# Patient Record
Sex: MALE | Race: WHITE | NOT HISPANIC OR LATINO | Employment: STUDENT | URBAN - METROPOLITAN AREA
[De-identification: names, ages, dates, MRNs, and addresses within clinical notes are randomized per-mention and may not be internally consistent; named-entity substitution may affect disease eponyms.]

---

## 2023-12-17 NOTE — PROGRESS NOTES
"Chief Complaint   Patient presents with    Right Ankle - Pain     Rolled during basketball, swelling and painful.       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    Last seen in 2019 for L PFS / PT.     Injury Mechanism: R foot pain started first, increased running training for baseball.  Pain started about 4-5 weeks ago.     R ankle pain, playing basketball, shot basket from half court, came down on side of foot rolling his ankle; rolled it 2 weeks ago. Wearing walking boot and doing ok. Pain is mostly medial. Was swollen and bruised. Has been able to WB in boot.     Location of pain:  R ankle, medial malleolus, R foot, 2nd metatarsal   Rest for 2 weeks   Icing some; taking ibuprofen.  Is improving ; worse in AM     Home for break - 3 weeks   Past [facility]K HX:  Specialty Problems    None   2019 L PFS / PT.    ROS  12 point ROS reviewed and is negative except for items listed  Happy with his weight, tests on his heart otherwise negative    Social Hx:  Social: lives at home with mother, father, brother  sports: baseball (pitcher)  school: [employer] senior year; exercise science. PT grad programs     Medications:   No current outpatient medications on file prior to visit.     No current facility-administered medications on file prior to visit.         Allergies:  No Known Allergies     Physical Exam:    Visit Vitals  Temp 36.1 °C (96.9 °F)   Ht 1.727 m (5' 8\")   Wt 91.5 kg (201 lb 11.5 oz)   BMI 30.67 kg/m²   BSA 2.1 m²        Vitals reviewed    General appearance: Well-appearing well-nourished  Psych: Normal mood and affect    Neuro: Normal sensation to light touch throughout the involved extremities  Vascular: No extremity edema or " discoloration.  Skin: negative.  Lymphatic: no regional lymphadenopathy present.  Eyes: no conjunctival injection.      BILATERAL   Lower Leg / Ankle / Foot Exam    Inspection:   Pes planus: None  Pes cavus: None  Deformity: None  Soft tissue swelling: Right medial ankle  Erythema: None  Ecchymosis: None  Calf atrophy: None    Range of motion:  Inversion (20-35) full, pain free  Eversion (5-25) full, pain free  Dorsiflexion (20-30) full, pain free  Plantarflexion (40-50) full, pain free  Adduction foot full, pain free  Abduction foot full, pain free    Palpation:  TTP ATFL No  TTP CFL No  TTP Deltoid ligament positive right  TTP Syndesmosis No  TTP Anterior joint line No  TTP Medial malleolus No  TTP Lateral malleolus No  TTP Tibia positive at distal  TTP Fibula No  TTP Talus positive right medial  TTP Calcaneus No  TTP Base of the fifth metatarsal No  TTP Navicular No  TTP Cuboid No  TTP Cuneiforms No  TTP Metatarsals positive right second  TTP Phalanges No    TTP Lis franc joint No  TTP MTP joints No  TTP IP joints No    TTP Achilles No  TTP Peroneal tendon No  TTP Posterior tibialis No  TTP Anterior tibialis No  TTP Extensor hallucis No  TTP Extensor tendons No  TTP Flexor hallucis longus No  TTP Sinus tarsi No  TTP Plantar fascia No    Strength:  Dorsiflexion no pain, 5/5  Plantarflexion no pain, 5/5  Inversion no pain, 5/5  Eversion  no pain, 5/5  Flexion MTP joints no pain, 5/5  Extension MTP joints no pain, 5/5  Flexion IP joints no pain, 5/5  Extension IP joints no pain, 5/5    Ligament Tests:  Anterior drawer: negative  Talar tilt: negative  Foot external rotation test: Pain on right medially  Tibia-fibula squeeze test: negative    Special Tests  Calcaneal squeeze: negative  Forefoot squeeze: neg  Forced passive dorsiflexion (anterior impingement): neg  Hill test: neg  Tinel's: neg at fibular head     Flexibility:   dorsiflexes to neutral with pain on right    Functional Exam:  Able to weight-bear but  pain over medial aspect ankle    Imaging:  X-rays of the right foot and ankle were performed today.  Foot imaging was negative for fracture or healing stress fracture.  Right ankle x-ray showed irregularity along the medial border of the talus with a punctate avulsion concerning for fracture.    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  Patient reported insidious onset of right foot pain associated with baseball conditioning near the second metatarsal.  He then suffered an acute ankle injury while playing pickup basketball 15 days ago.  He had significant swelling and trouble weightbearing was placed into a short walking boot by the school .  He presents today with ongoing pain abdominally over the medial aspect of the right ankle.  Exam today was notable for marked swelling over the medial ankle with positive TTP over the distal tibia, deltoid ligament, medial aspect of the talus and second metatarsal.  He had negative metatarsal squeeze test but positive medial ankle pain with external rotation.  He had pain with weightbearing and x-rays were performed that showed no fracture in the foot but significant irregularity along the medial border of the talus concerning for fracture.  We recommended he remain in the walking boot and an MRI of the right ankle was ordered.  The family should contact us by phone once the MRIs been completed so we can set up a telehealth visit.        ** Please excuse any errors in grammar or translation related to this dictation. Voice recognition software was utilized to prepare this document. **

## 2023-12-18 ENCOUNTER — ANCILLARY PROCEDURE (OUTPATIENT)
Dept: RADIOLOGY | Facility: CLINIC | Age: 23
End: 2023-12-18
Payer: COMMERCIAL

## 2023-12-18 ENCOUNTER — OFFICE VISIT (OUTPATIENT)
Dept: ORTHOPEDIC SURGERY | Facility: CLINIC | Age: 23
End: 2023-12-18
Payer: COMMERCIAL

## 2023-12-18 VITALS — HEIGHT: 68 IN | BODY MASS INDEX: 30.57 KG/M2 | WEIGHT: 201.72 LBS | TEMPERATURE: 96.9 F

## 2023-12-18 DIAGNOSIS — M79.671 RIGHT FOOT PAIN: ICD-10-CM

## 2023-12-18 DIAGNOSIS — M25.571 ACUTE RIGHT ANKLE PAIN: ICD-10-CM

## 2023-12-18 DIAGNOSIS — S92.124A CLOSED NONDISPLACED FRACTURE OF BODY OF RIGHT TALUS, INITIAL ENCOUNTER: Primary | ICD-10-CM

## 2023-12-18 PROCEDURE — 99214 OFFICE O/P EST MOD 30 MIN: CPT | Performed by: PEDIATRICS

## 2023-12-18 PROCEDURE — 99204 OFFICE O/P NEW MOD 45 MIN: CPT | Performed by: PEDIATRICS

## 2023-12-18 PROCEDURE — 73630 X-RAY EXAM OF FOOT: CPT | Mod: RT,FY

## 2023-12-18 PROCEDURE — 73610 X-RAY EXAM OF ANKLE: CPT | Mod: RT,FY

## 2023-12-18 ASSESSMENT — PAIN SCALES - GENERAL: PAINLEVEL: 4

## 2023-12-18 NOTE — PATIENT INSTRUCTIONS
The patient has been referred for advanced imaging through [facility]. Please call [phone] and set up an appointment to have this study completed. We recommend booking at a NON-HOSPITAL location. You will need to allow time for the pre-authorization process. We recommend you call back 1 day prior to your appointment to confirm that your insurance company approved the study. Do not having imaging completed until it is approved.     We request that you call our main office at [phone] once your imaging study has been completed. You may set up an in person appointment or a telehealth appointment to review the imaging results. Please leave us a good call back number. Make sure your voicemail box is accepting messages and be aware we often make calls from private numbers. If you do not receive a call back within 48 business hours, please feel free to call our office again.

## 2024-01-02 ENCOUNTER — ANCILLARY PROCEDURE (OUTPATIENT)
Dept: RADIOLOGY | Facility: CLINIC | Age: 24
End: 2024-01-02
Payer: COMMERCIAL

## 2024-01-02 DIAGNOSIS — S92.124A CLOSED NONDISPLACED FRACTURE OF BODY OF RIGHT TALUS, INITIAL ENCOUNTER: ICD-10-CM

## 2024-01-02 DIAGNOSIS — M25.571 ACUTE RIGHT ANKLE PAIN: ICD-10-CM

## 2024-01-02 PROCEDURE — 73721 MRI JNT OF LWR EXTRE W/O DYE: CPT | Mod: RT

## 2024-01-02 PROCEDURE — 73721 MRI JNT OF LWR EXTRE W/O DYE: CPT | Mod: RIGHT SIDE | Performed by: RADIOLOGY

## 2024-01-04 ENCOUNTER — TELEMEDICINE (OUTPATIENT)
Dept: ORTHOPEDIC SURGERY | Facility: CLINIC | Age: 24
End: 2024-01-04
Payer: COMMERCIAL

## 2024-01-04 DIAGNOSIS — S92.101D: Primary | ICD-10-CM

## 2024-01-04 DIAGNOSIS — S93.401D MODERATE RIGHT ANKLE SPRAIN, SUBSEQUENT ENCOUNTER: ICD-10-CM

## 2024-01-04 PROCEDURE — 99214 OFFICE O/P EST MOD 30 MIN: CPT | Performed by: PEDIATRICS

## 2024-01-04 NOTE — PROGRESS NOTES
Chief Complaint   Patient presents with    Right Ankle - Pain     Rolled during basketball, swelling and painful.       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  Patient reported insidious onset of right foot pain associated with baseball conditioning near the second metatarsal.  He then suffered an acute ankle injury while playing pickup basketball 15 days ago.  He had significant swelling and trouble weightbearing was placed into a short walking boot by the school .  He presents today with ongoing pain abdominally over the medial aspect of the right ankle.  Exam today was notable for marked swelling over the medial ankle with positive TTP over the distal tibia, deltoid ligament, medial aspect of the talus and second metatarsal.  He had negative metatarsal squeeze test but positive medial ankle pain with external rotation.  He had pain with weightbearing and x-rays were performed that showed no fracture in the foot but significant irregularity along the medial border of the talus concerning for fracture.  We recommended he remain in the walking boot and an MRI of the right ankle was ordered.  The family should contact us by phone once the MRIs been completed so we can set up a telehealth visit.    Telehealth visit was performed on 1/4/2024 with the patient.  Patient with improved pain.  He reports removing the boot and walking at times around the house.  Overall he feels improved but does return to school this weekend and is concerned about his ankle  Past MSK HX:  Specialty Problems    None   2019 L PFS / PT.    ROS  12 point ROS reviewed and is negative except for items listed  Happy with his  weight, tests on his heart otherwise negative    Social Hx:  Social: lives at home with mother, father, brother  sports: baseball (pitcher)  school: [employer] senior year; exercise science. PT grad programs     Medications:   No current outpatient medications on file prior to visit.     No current facility-administered medications on file prior to visit.         Allergies:  No Known Allergies     Physical Exam:  Deferred due to telemed visit     Imaging:  MRI   IMPRESSION:  1. High-grade sprain of the deltoid ligament and moderate grade  sprains of the anterior talofibular, calcaneofibular and spring  ligaments.  2. Marrow edema within the sustentaculum patellae, inferolateral  navicular, anteromedial talar neck, superolateral aspect of the talar  dome and within the medial malleolus without discrete fractures.  Findings are likely secondary to direct contusions.  3. Ankle joint effusion  4. 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  Patient reported insidious onset of right foot pain associated with baseball conditioning near the second metatarsal.  He then suffered an acute ankle injury while playing pickup basketball 15 days ago.  He had significant swelling and trouble weightbearing was placed into a short walking boot by the school .  He presents today with ongoing pain abdominally over the medial aspect of the right ankle.  Exam today was notable for marked swelling over the medial ankle with positive TTP over the distal tibia, deltoid ligament, medial aspect of the talus and second metatarsal.  He had negative metatarsal squeeze test but positive medial ankle pain with external rotation.  He had pain with weightbearing and x-rays were performed that showed no fracture in the foot but significant irregularity along  the medial border of the talus concerning for fracture.  We recommended he remain in the walking boot and an MRI of the right ankle was ordered.  The family should contact us by phone once the MRIs been completed so we can set up a telehealth visit.    Telehealth visit was performed on 1/4/2024 with the patient.  We reviewed images online.  Because he has ATFL, CFL, and deltoid ligament injuries in conjunction with a talar OCD lesion and possible os trigonum fracture as well as talar neck edema we recommended surgical consultation with Dr. Deluna.  He was instructed to remain in his boot and see her for evaluation    ** Please excuse any errors in grammar or translation related to this dictation. Voice recognition software was utilized to prepare this document. **   You can access the FollowMyHealth Patient Portal offered by [facility] by registering at the following website: [email or web address]. By joining [employer]’s FollowMyHealth portal, you will also be able to view your health information using other applications (apps) compatible with our system.

## 2024-01-08 ENCOUNTER — OFFICE VISIT (OUTPATIENT)
Dept: ORTHOPEDIC SURGERY | Facility: HOSPITAL | Age: 24
End: 2024-01-08
Payer: COMMERCIAL

## 2024-01-08 DIAGNOSIS — S93.491A SPRAIN OF ANTERIOR TALOFIBULAR LIGAMENT, RIGHT, INITIAL ENCOUNTER: ICD-10-CM

## 2024-01-08 DIAGNOSIS — S93.411A SPRAIN OF CALCANEOFIBULAR LIGAMENT OF RIGHT ANKLE, INITIAL ENCOUNTER: ICD-10-CM

## 2024-01-08 DIAGNOSIS — M95.8 OSTEOCHONDRAL DEFECT OF TALUS: Primary | ICD-10-CM

## 2024-01-08 PROCEDURE — 99204 OFFICE O/P NEW MOD 45 MIN: CPT | Performed by: ORTHOPAEDIC SURGERY

## 2024-01-08 PROCEDURE — 99214 OFFICE O/P EST MOD 30 MIN: CPT | Performed by: ORTHOPAEDIC SURGERY

## 2024-01-08 NOTE — PROGRESS NOTES
HISTORY OF PRESENT ILLNESS  This is a pleasant 23 y.o. year old male  who presents on 01/08/2024 at the request of Dr. Lim for evaluation of  right ankle  pain that has been present over/since 4 weeks.    How the condition occurred or started: playing basketball  Location of pain (patient points to): anterior and medial ankle  Quality of pain: Moderate  Modifying Factors: worse with WB  Associated Signs and symptoms: swelled up a bit right after injury  Previous Treatment: Boot, ice  He is a collegiate pitcher, right-handed.  States that he has rolled his right ankle several times but this time was the worst and different from others. He is studying to become a physical therapist.  He is a senior this year.      PHYSICAL EXAMINATION  Constitutional Exam: patient's height and weight reviewed, well-kempt  Psychiatric Exam: alert and oriented x 3, appropriate mood and behavior  Eye Exam: FANY, EOMI  Pulmonary Exam: breathing non-labored, no apparent distress  Lymphatic exam: no appreciable lymphadenopathy in the lower extremities  Cardiovascular exam: DP pulses 2+ bilaterally, PT 2+ bilaterally, toes are pink with good capillary refill, no pitting edema  Skin exam: no open lesions, rashes, abrasions or ulcerations  Neurological exam: sensation to light touch intact in both lower extremities in peripheral and dermatomal distributions (except for any abnormalities noted in musculoskeletal exam)    Musculoskeletal exam: right ankle and foot: 2+ anterior drawer and 1+ talar tilt, talar crepitus, neg DF-eversion stress test, ankle effusion, less tender over anterior deltoid, sore over anterolateral joint line of ankle and worse with passive hyperdorsiflexion    DATA/RESULTS REVIEW: I personally reviewed the patient's x-ray images and reports of the  right ankle showing effusion, possible avulsion fracture tiny versus calcification ossicle just inferior to medial malleolus .     I personally reviewed the patient's MRI  images and reports showing likely OCD posterolateral talus due to cartilage surface disruption seen on coronal MRI images and underlying bone edema.  High grade 3 tear of ATFL, moderate grade tear CFL, deltoid and spring ligament partial injury, bone edema in malleoli and talus and other areas as noted by radiologist.    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  Discussed with the patient and his parents the options for treatment.  Due to the concern for cartilage unstable chondral flap or full-thickness OCD, in addition to his ruptured ATFL and significant injury to CFL with clinical instability and previous ankle sprains, we would recommend surgical intervention which would involve ankle arthroscopy with possible microfracture of OCD talus posterior laterally followed by open Broström Henderson repair of ATFL and likely CFL with suture anchor and internal brace technique.  Discussed with him he would have to be nonweightbearing for the first 6 weeks after surgery if microfracture is performed.  He would still start physical therapy around the second or third week after surgery but just have restricted weightbearing to allow for the cartilage lesion to heal appropriately.  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  He has at high risk for recurrent injury which could involve worsening his cartilage injury and possible tendon tearing due to significant ligament instability.  He will think about the options and let us know  how he would like to proceed.  Outpatient surgery done on dry anesthetic with popliteal block.  No high-impact activities until least 3 months after surgery if microfracture is performed.  If just a chondral flap and no microfracture needed then he will be nonweightbearing for 2 to 3 weeks to allow the ligament reconstruction to start healing appropriately and then we will advance him to boot weightbearing as tolerated.  Risks of surgery including pain, bleeding, infection, wound healing problems, soft tissue line problems, bone healing problems, hardware complications, injury nerves or vessels, chronic ankle stiffness or swelling, recurrent instability, DVT and others were discussed with the patient.  We also discussed with the patient need to reach out to disability services at his college to set up security transport to and from class, classroom alterations or other seating so he can elevate his leg, discussed with his professors as he may need  accommodations with respect to projects or exams.  Deltoid ligament should heal well; however we will evaluate at arthroscopy and repair with suture anchor only as needed but unlikely due to mri findings.  The patient's questions were answered in detail.      I discussed with patient the procedure in detail, risks and benefits of procedure, post-op protocol, anesthetic used with possible regional block, timeline of recovery, need for protective weightbearing and/or bracing after procedure, pain management protocol, DVT prophylaxis protocol, home preparation suggestions, pre-op surgery preparation, and other pertinent information.    Note dictated with [employer] software, completed without full type editing to avoid delay.      Dear Referring Physician,  It was my pleasure to see your patient, in the office today.  Please refer to the detailed notes above for my findings and recommendations.  Thank you once again for your consultation request.  If you  "have any further questions or concerns, please feel free to contact me via email or at the phone number and address below.  Sincerely,    Italia Deluna MD   of Orthopedic Surgery, [employer]  Dual Fellowship-trained in Orthopedic Sports Medicine (Knee and Shoulder), and Foot and Ankle Surgery  [facility] [employer]   [employer] Subspecialty Certificate in Orthopedic Sports Medicine  Head Team Physician Case \"[place]\" and [facility] \"[employer]\"  Team [facility] Center[facility] Physician [phone] Par[employer]ympic Games  Team [place] Figure Skating Medical Practitioner, including International Event Coverage  Director, Foot and Ankle Division, Department of Orthopedics    [address], [place] [address]  [email or web address]  Office [phone]    "

## 2024-01-23 ENCOUNTER — PREP FOR PROCEDURE (OUTPATIENT)
Dept: ORTHOPEDIC SURGERY | Facility: CLINIC | Age: 24
End: 2024-01-23
Payer: COMMERCIAL

## 2024-01-23 DIAGNOSIS — S93.491A SPRAIN OF ANTERIOR TALOFIBULAR LIGAMENT OF RIGHT ANKLE, INITIAL ENCOUNTER: ICD-10-CM

## 2024-01-23 DIAGNOSIS — S93.411D SPRAIN OF CALCANEOFIBULAR LIGAMENT OF RIGHT ANKLE, SUBSEQUENT ENCOUNTER: ICD-10-CM

## 2024-01-23 DIAGNOSIS — M95.8 OSTEOCHONDRAL DEFECT OF TALUS: ICD-10-CM

## 2024-01-23 PROBLEM — S93.411A SPRAIN OF CALCANEOFIBULAR LIGAMENT OF RIGHT ANKLE: Status: ACTIVE | Noted: 2024-01-23

## 2024-02-06 ENCOUNTER — PREP FOR PROCEDURE (OUTPATIENT)
Dept: ORTHOPEDIC SURGERY | Facility: HOSPITAL | Age: 24
End: 2024-02-06
Payer: COMMERCIAL

## 2024-02-06 ENCOUNTER — ANESTHESIA EVENT (OUTPATIENT)
Dept: OPERATING ROOM | Facility: HOSPITAL | Age: 24
End: 2024-02-06
Payer: COMMERCIAL

## 2024-02-06 RX ORDER — SODIUM CHLORIDE 9 MG/ML
100 INJECTION, SOLUTION INTRAVENOUS CONTINUOUS
Status: CANCELLED | OUTPATIENT
Start: 2024-02-06

## 2024-02-06 NOTE — ANESTHESIA PREPROCEDURE EVALUATION
Patient: Tim Lu    Procedure Information       Date/Time: 02/07/24 8000    Procedure: Right Ankle Arthroscopy; Subchondral Drilling Osteochrondral Defect of Talus; Brostrum Henderson Repair of Both Collateral Ligaments of Ankle (Right: Ankle)    Location: [facility] / Virtual [facility]    Surgeons: Italia Deluna MD            Relevant Problems   Anesthesia (within normal limits)      Cardiovascular (within normal limits)      Pulmonary (within normal limits)       Clinical information reviewed:                   NPO Detail:  No data recorded     Physical Exam    Airway  Mallampati: I     Cardiovascular   Rhythm: regular  Rate: normal     Dental    Pulmonary   Breath sounds clear to auscultation     Abdominal            Anesthesia Plan    History of general anesthesia?: yes  History of complications of general anesthesia?: no    ASA 1     general and regional     intravenous induction   Anesthetic plan and risks discussed with patient.    Plan discussed with CRNA and CAA.

## 2024-02-07 ENCOUNTER — ANESTHESIA (OUTPATIENT)
Dept: OPERATING ROOM | Facility: HOSPITAL | Age: 24
End: 2024-02-07
Payer: COMMERCIAL

## 2024-02-07 ENCOUNTER — APPOINTMENT (OUTPATIENT)
Dept: RADIOLOGY | Facility: HOSPITAL | Age: 24
End: 2024-02-07
Payer: COMMERCIAL

## 2024-02-07 ENCOUNTER — HOSPITAL ENCOUNTER (OUTPATIENT)
Facility: HOSPITAL | Age: 24
Setting detail: OUTPATIENT SURGERY
Discharge: HOME | End: 2024-02-07
Attending: ORTHOPAEDIC SURGERY | Admitting: ORTHOPAEDIC SURGERY
Payer: COMMERCIAL

## 2024-02-07 VITALS
BODY MASS INDEX: 31.04 KG/M2 | TEMPERATURE: 98.1 F | WEIGHT: 204.81 LBS | DIASTOLIC BLOOD PRESSURE: 91 MMHG | RESPIRATION RATE: 13 BRPM | HEIGHT: 68 IN | SYSTOLIC BLOOD PRESSURE: 145 MMHG | HEART RATE: 79 BPM | OXYGEN SATURATION: 99 %

## 2024-02-07 DIAGNOSIS — M95.8 OSTEOCHONDRAL DEFECT OF TALUS: ICD-10-CM

## 2024-02-07 DIAGNOSIS — S93.491A SPRAIN OF ANTERIOR TALOFIBULAR LIGAMENT OF RIGHT ANKLE, INITIAL ENCOUNTER: ICD-10-CM

## 2024-02-07 DIAGNOSIS — S93.411D SPRAIN OF CALCANEOFIBULAR LIGAMENT OF RIGHT ANKLE, SUBSEQUENT ENCOUNTER: ICD-10-CM

## 2024-02-07 DIAGNOSIS — S93.411A SPRAIN OF CALCANEOFIBULAR LIGAMENT OF RIGHT ANKLE, INITIAL ENCOUNTER: Primary | ICD-10-CM

## 2024-02-07 PROCEDURE — A4217 STERILE WATER/SALINE, 500 ML: HCPCS | Performed by: ORTHOPAEDIC SURGERY

## 2024-02-07 PROCEDURE — 3700000001 HC GENERAL ANESTHESIA TIME - INITIAL BASE CHARGE: Performed by: ORTHOPAEDIC SURGERY

## 2024-02-07 PROCEDURE — 27696 REPAIR OF ANKLE LIGAMENTS: CPT | Performed by: ORTHOPAEDIC SURGERY

## 2024-02-07 PROCEDURE — 3600000004 HC OR TIME - INITIAL BASE CHARGE - PROCEDURE LEVEL FOUR: Performed by: ORTHOPAEDIC SURGERY

## 2024-02-07 PROCEDURE — 2500000004 HC RX 250 GENERAL PHARMACY W/ HCPCS (ALT 636 FOR OP/ED): Performed by: ANESTHESIOLOGY

## 2024-02-07 PROCEDURE — 2500000004 HC RX 250 GENERAL PHARMACY W/ HCPCS (ALT 636 FOR OP/ED): Performed by: ORTHOPAEDIC SURGERY

## 2024-02-07 PROCEDURE — 3700000002 HC GENERAL ANESTHESIA TIME - EACH INCREMENTAL 1 MINUTE: Performed by: ORTHOPAEDIC SURGERY

## 2024-02-07 PROCEDURE — 2500000005 HC RX 250 GENERAL PHARMACY W/O HCPCS: Performed by: ANESTHESIOLOGIST ASSISTANT

## 2024-02-07 PROCEDURE — C1713 ANCHOR/SCREW BN/BN,TIS/BN: HCPCS | Performed by: ORTHOPAEDIC SURGERY

## 2024-02-07 PROCEDURE — 7100000002 HC RECOVERY ROOM TIME - EACH INCREMENTAL 1 MINUTE: Performed by: ORTHOPAEDIC SURGERY

## 2024-02-07 PROCEDURE — A27696 PR REPAIR BOTH COLLAT ANKL LIGMT,PRIMRY: Performed by: ANESTHESIOLOGIST ASSISTANT

## 2024-02-07 PROCEDURE — 7100000001 HC RECOVERY ROOM TIME - INITIAL BASE CHARGE: Performed by: ORTHOPAEDIC SURGERY

## 2024-02-07 PROCEDURE — 64445 NJX AA&/STRD SCIATIC NRV IMG: CPT | Performed by: ANESTHESIOLOGY

## 2024-02-07 PROCEDURE — 29891 ARTHR ANK OSTCHN DF TAL&/TIB: CPT | Performed by: ORTHOPAEDIC SURGERY

## 2024-02-07 PROCEDURE — 76000 FLUOROSCOPY <1 HR PHYS/QHP: CPT | Mod: RT

## 2024-02-07 PROCEDURE — 2500000004 HC RX 250 GENERAL PHARMACY W/ HCPCS (ALT 636 FOR OP/ED): Performed by: ANESTHESIOLOGIST ASSISTANT

## 2024-02-07 PROCEDURE — 2780000003 HC OR 278 NO HCPCS: Performed by: ORTHOPAEDIC SURGERY

## 2024-02-07 PROCEDURE — 3600000009 HC OR TIME - EACH INCREMENTAL 1 MINUTE - PROCEDURE LEVEL FOUR: Performed by: ORTHOPAEDIC SURGERY

## 2024-02-07 PROCEDURE — 7100000010 HC PHASE TWO TIME - EACH INCREMENTAL 1 MINUTE: Performed by: ORTHOPAEDIC SURGERY

## 2024-02-07 PROCEDURE — A27696 PR REPAIR BOTH COLLAT ANKL LIGMT,PRIMRY: Performed by: ANESTHESIOLOGY

## 2024-02-07 PROCEDURE — 2720000007 HC OR 272 NO HCPCS: Performed by: ORTHOPAEDIC SURGERY

## 2024-02-07 PROCEDURE — 7100000009 HC PHASE TWO TIME - INITIAL BASE CHARGE: Performed by: ORTHOPAEDIC SURGERY

## 2024-02-07 DEVICE — IB KIT, BC, W/ CC FT AND JUMPSTART
Type: IMPLANTABLE DEVICE | Site: ANKLE | Status: FUNCTIONAL
Brand: ARTHREX®

## 2024-02-07 DEVICE — IMPLANTABLE DEVICE: Type: IMPLANTABLE DEVICE | Site: ANKLE | Status: FUNCTIONAL

## 2024-02-07 RX ORDER — OXYCODONE AND ACETAMINOPHEN 5; 325 MG/1; MG/1
1-2 TABLET ORAL EVERY 6 HOURS PRN
Qty: 40 TABLET | Refills: 0 | Status: SHIPPED | OUTPATIENT
Start: 2024-02-07 | End: 2024-02-14

## 2024-02-07 RX ORDER — LIDOCAINE HYDROCHLORIDE 10 MG/ML
0.1 INJECTION, SOLUTION EPIDURAL; INFILTRATION; INTRACAUDAL; PERINEURAL ONCE
Status: DISCONTINUED | OUTPATIENT
Start: 2024-02-07 | End: 2024-02-07 | Stop reason: HOSPADM

## 2024-02-07 RX ORDER — DEXAMETHASONE SODIUM PHOSPHATE 4 MG/ML
INJECTION, SOLUTION INTRA-ARTICULAR; INTRALESIONAL; INTRAMUSCULAR; INTRAVENOUS; SOFT TISSUE AS NEEDED
Status: DISCONTINUED | OUTPATIENT
Start: 2024-02-07 | End: 2024-02-07

## 2024-02-07 RX ORDER — ONDANSETRON HYDROCHLORIDE 2 MG/ML
INJECTION, SOLUTION INTRAVENOUS AS NEEDED
Status: DISCONTINUED | OUTPATIENT
Start: 2024-02-07 | End: 2024-02-07

## 2024-02-07 RX ORDER — SODIUM CHLORIDE, SODIUM LACTATE, POTASSIUM CHLORIDE, CALCIUM CHLORIDE 600; 310; 30; 20 MG/100ML; MG/100ML; MG/100ML; MG/100ML
INJECTION, SOLUTION INTRAVENOUS CONTINUOUS PRN
Status: DISCONTINUED | OUTPATIENT
Start: 2024-02-07 | End: 2024-02-07

## 2024-02-07 RX ORDER — SODIUM CHLORIDE, SODIUM LACTATE, POTASSIUM CHLORIDE, AND CALCIUM CHLORIDE .6; .31; .03; .02 G/100ML; G/100ML; G/100ML; G/100ML
IRRIGANT IRRIGATION AS NEEDED
Status: DISCONTINUED | OUTPATIENT
Start: 2024-02-07 | End: 2024-02-07 | Stop reason: HOSPADM

## 2024-02-07 RX ORDER — OXYCODONE HYDROCHLORIDE 5 MG/1
5 TABLET ORAL EVERY 4 HOURS PRN
Status: DISCONTINUED | OUTPATIENT
Start: 2024-02-07 | End: 2024-02-07 | Stop reason: HOSPADM

## 2024-02-07 RX ORDER — ONDANSETRON HYDROCHLORIDE 2 MG/ML
4 INJECTION, SOLUTION INTRAVENOUS ONCE AS NEEDED
Status: COMPLETED | OUTPATIENT
Start: 2024-02-07 | End: 2024-02-07

## 2024-02-07 RX ORDER — ROCURONIUM BROMIDE 10 MG/ML
INJECTION, SOLUTION INTRAVENOUS AS NEEDED
Status: DISCONTINUED | OUTPATIENT
Start: 2024-02-07 | End: 2024-02-07

## 2024-02-07 RX ORDER — MIDAZOLAM HYDROCHLORIDE 1 MG/ML
INJECTION, SOLUTION INTRAMUSCULAR; INTRAVENOUS AS NEEDED
Status: DISCONTINUED | OUTPATIENT
Start: 2024-02-07 | End: 2024-02-07

## 2024-02-07 RX ORDER — ACETAMINOPHEN 325 MG/1
650 TABLET ORAL EVERY 4 HOURS PRN
Status: DISCONTINUED | OUTPATIENT
Start: 2024-02-07 | End: 2024-02-07 | Stop reason: HOSPADM

## 2024-02-07 RX ORDER — ASPIRIN 325 MG
325 TABLET, DELAYED RELEASE (ENTERIC COATED) ORAL DAILY
Qty: 21 TABLET | Refills: 0 | Status: SHIPPED | OUTPATIENT
Start: 2024-02-08

## 2024-02-07 RX ORDER — DOCUSATE SODIUM 100 MG/1
100 CAPSULE, LIQUID FILLED ORAL 2 TIMES DAILY
Qty: 20 CAPSULE | Refills: 0 | Status: SHIPPED | OUTPATIENT
Start: 2024-02-07 | End: 2024-03-01 | Stop reason: ALTCHOICE

## 2024-02-07 RX ORDER — ONDANSETRON 4 MG/1
4 TABLET, FILM COATED ORAL EVERY 8 HOURS PRN
Qty: 20 TABLET | Refills: 0 | Status: SHIPPED | OUTPATIENT
Start: 2024-02-07 | End: 2024-03-01 | Stop reason: ALTCHOICE

## 2024-02-07 RX ORDER — CEFAZOLIN 1 G/1
INJECTION, POWDER, FOR SOLUTION INTRAVENOUS AS NEEDED
Status: DISCONTINUED | OUTPATIENT
Start: 2024-02-07 | End: 2024-02-07

## 2024-02-07 RX ORDER — SODIUM CHLORIDE, SODIUM LACTATE, POTASSIUM CHLORIDE, CALCIUM CHLORIDE 600; 310; 30; 20 MG/100ML; MG/100ML; MG/100ML; MG/100ML
100 INJECTION, SOLUTION INTRAVENOUS CONTINUOUS
Status: DISCONTINUED | OUTPATIENT
Start: 2024-02-07 | End: 2024-02-07 | Stop reason: HOSPADM

## 2024-02-07 RX ORDER — CEFAZOLIN SODIUM 2 G/100ML
2 INJECTION, SOLUTION INTRAVENOUS ONCE
Status: DISCONTINUED | OUTPATIENT
Start: 2024-02-07 | End: 2024-02-07 | Stop reason: HOSPADM

## 2024-02-07 RX ORDER — PROPOFOL 10 MG/ML
INJECTION, EMULSION INTRAVENOUS AS NEEDED
Status: DISCONTINUED | OUTPATIENT
Start: 2024-02-07 | End: 2024-02-07

## 2024-02-07 RX ORDER — ALBUTEROL SULFATE 0.83 MG/ML
2.5 SOLUTION RESPIRATORY (INHALATION) ONCE AS NEEDED
Status: DISCONTINUED | OUTPATIENT
Start: 2024-02-07 | End: 2024-02-07 | Stop reason: HOSPADM

## 2024-02-07 RX ORDER — SODIUM CHLORIDE 0.9 G/100ML
IRRIGANT IRRIGATION AS NEEDED
Status: DISCONTINUED | OUTPATIENT
Start: 2024-02-07 | End: 2024-02-07 | Stop reason: HOSPADM

## 2024-02-07 RX ORDER — FENTANYL CITRATE 50 UG/ML
INJECTION, SOLUTION INTRAMUSCULAR; INTRAVENOUS AS NEEDED
Status: DISCONTINUED | OUTPATIENT
Start: 2024-02-07 | End: 2024-02-07

## 2024-02-07 RX ORDER — SODIUM CHLORIDE 9 MG/ML
100 INJECTION, SOLUTION INTRAVENOUS CONTINUOUS
Status: DISCONTINUED | OUTPATIENT
Start: 2024-02-07 | End: 2024-02-07 | Stop reason: HOSPADM

## 2024-02-07 RX ORDER — IPRATROPIUM BROMIDE 0.5 MG/2.5ML
500 SOLUTION RESPIRATORY (INHALATION) ONCE
Status: DISCONTINUED | OUTPATIENT
Start: 2024-02-07 | End: 2024-02-07 | Stop reason: HOSPADM

## 2024-02-07 RX ORDER — LIDOCAINE HYDROCHLORIDE 20 MG/ML
INJECTION, SOLUTION INFILTRATION; PERINEURAL AS NEEDED
Status: DISCONTINUED | OUTPATIENT
Start: 2024-02-07 | End: 2024-02-07

## 2024-02-07 RX ADMIN — FENTANYL CITRATE 50 MCG: 50 INJECTION, SOLUTION INTRAMUSCULAR; INTRAVENOUS at 11:36

## 2024-02-07 RX ADMIN — SODIUM CHLORIDE, POTASSIUM CHLORIDE, SODIUM LACTATE AND CALCIUM CHLORIDE: 600; 310; 30; 20 INJECTION, SOLUTION INTRAVENOUS at 11:24

## 2024-02-07 RX ADMIN — SUGAMMADEX 200 MG: 100 INJECTION, SOLUTION INTRAVENOUS at 14:27

## 2024-02-07 RX ADMIN — ONDANSETRON 4 MG: 2 INJECTION INTRAMUSCULAR; INTRAVENOUS at 15:06

## 2024-02-07 RX ADMIN — SODIUM CHLORIDE, POTASSIUM CHLORIDE, SODIUM LACTATE AND CALCIUM CHLORIDE 100 ML/HR: 600; 310; 30; 20 INJECTION, SOLUTION INTRAVENOUS at 15:06

## 2024-02-07 RX ADMIN — FENTANYL CITRATE 100 MCG: 50 INJECTION, SOLUTION INTRAMUSCULAR; INTRAVENOUS at 11:20

## 2024-02-07 RX ADMIN — DEXAMETHASONE SODIUM PHOSPHATE 4 MG: 4 INJECTION, SOLUTION INTRAMUSCULAR; INTRAVENOUS at 11:42

## 2024-02-07 RX ADMIN — ROCURONIUM BROMIDE 20 MG: 10 INJECTION, SOLUTION INTRAVENOUS at 12:23

## 2024-02-07 RX ADMIN — MIDAZOLAM HYDROCHLORIDE 5 MG: 1 INJECTION, SOLUTION INTRAMUSCULAR; INTRAVENOUS at 11:20

## 2024-02-07 RX ADMIN — ONDANSETRON 4 MG: 2 INJECTION INTRAMUSCULAR; INTRAVENOUS at 14:13

## 2024-02-07 RX ADMIN — ROCURONIUM BROMIDE 30 MG: 10 INJECTION, SOLUTION INTRAVENOUS at 13:06

## 2024-02-07 RX ADMIN — ACETAMINOPHEN 650 MG: 325 TABLET ORAL at 15:27

## 2024-02-07 RX ADMIN — CEFAZOLIN 2 G: 1 INJECTION, POWDER, FOR SOLUTION INTRAMUSCULAR; INTRAVENOUS at 11:40

## 2024-02-07 RX ADMIN — LIDOCAINE HYDROCHLORIDE 100 MG: 20 INJECTION, SOLUTION INFILTRATION; PERINEURAL at 11:36

## 2024-02-07 RX ADMIN — PROPOFOL 200 MG: 10 INJECTION, EMULSION INTRAVENOUS at 11:36

## 2024-02-07 RX ADMIN — ROCURONIUM BROMIDE 50 MG: 10 INJECTION, SOLUTION INTRAVENOUS at 11:36

## 2024-02-07 ASSESSMENT — PAIN - FUNCTIONAL ASSESSMENT
PAIN_FUNCTIONAL_ASSESSMENT: 0-10

## 2024-02-07 ASSESSMENT — COLUMBIA-SUICIDE SEVERITY RATING SCALE - C-SSRS
6. HAVE YOU EVER DONE ANYTHING, STARTED TO DO ANYTHING, OR PREPARED TO DO ANYTHING TO END YOUR LIFE?: NO
2. HAVE YOU ACTUALLY HAD ANY THOUGHTS OF KILLING YOURSELF?: NO
1. IN THE PAST MONTH, HAVE YOU WISHED YOU WERE DEAD OR WISHED YOU COULD GO TO SLEEP AND NOT WAKE UP?: NO

## 2024-02-07 ASSESSMENT — PAIN SCALES - GENERAL
PAINLEVEL_OUTOF10: 0 - NO PAIN
PAINLEVEL_OUTOF10: 3
PAIN_LEVEL: 0
PAINLEVEL_OUTOF10: 1
PAINLEVEL_OUTOF10: 0 - NO PAIN
PAINLEVEL_OUTOF10: 0 - NO PAIN

## 2024-02-07 ASSESSMENT — ENCOUNTER SYMPTOMS
MYALGIAS: 1
JOINT SWELLING: 1

## 2024-02-07 NOTE — NURSING NOTE
1600- Dr. Garcia came to see patient for right sided facial numbness, stated that it could be from the side the tube was on and that it should improve with time. Discharge teaching done with patient and parents. Patient ambulated to bathroom with crutches. Patient transport requested  1624- IV removed, patient and family have no questions at this time

## 2024-02-07 NOTE — H&P
History Of Present Illness  Tim Lu is a 23 y.o. male presenting with right ankle sprain. Patient here for right ankle arthroscopy with extensive debridement and possible subchondral drilling of talar OCD, open lateral ligament repair Brostrum-Henderson procedure. Patient reports he is doing well. No complaints.      Past Medical History  Past Medical History:   Diagnosis Date    Achilles tendinitis, unspecified leg 03/20/2014    Achilles tendinitis    Acute pharyngitis, unspecified 03/20/2014    Sore throat    Acute sinusitis, unspecified 09/25/2018    Acute sinusitis with symptoms > 10 days    Bitten or stung by nonvenomous insect and other nonvenomous arthropods, initial encounter 12/06/2017    Tick bite    Effusion, unspecified knee 03/14/2019    Swelling of knee    Other injuries of left eye and orbit, initial encounter 03/16/2017    Abrasion of sclera of left eye    Other specified enthesopathies of unspecified lower limb, excluding foot 04/22/2019    Quadriceps tendinitis    Pain in left knee 01/17/2020    Left knee pain    Patellar tendinitis, left knee 12/09/2019    Patellar tendinitis of left knee    Patellofemoral disorders, left knee 12/09/2019    Patellofemoral pain syndrome of left knee    Personal history of diseases of the skin and subcutaneous tissue 03/09/2022    History of eczema    Personal history of diseases of the skin and subcutaneous tissue 11/07/2017    History of acne    Personal history of other diseases of the nervous system and sense organs 05/26/2015    History of acute conjunctivitis    Personal history of other diseases of the respiratory system 02/28/2018    History of acute sinusitis    Personal history of other diseases of the respiratory system 02/04/2017    History of acute sinusitis    Personal history of other diseases of the respiratory system 04/29/2014    History of streptococcal pharyngitis    Personal history of other diseases of the respiratory system 11/24/2021     "History of acute sinusitis    Personal history of other infectious and parasitic diseases 01/21/2020    History of tinea corporis    Personal history of other specified conditions 10/09/2018    History of persistent cough    Personal history of other specified conditions 12/15/2021    History of nasal congestion    Personal history of other specified conditions 12/15/2021    History of persistent cough    Unspecified injury of left eye and orbit, initial encounter     Left eye injury, initial encounter       Surgical History  History reviewed. No pertinent surgical history.     Social History  He reports that he has never smoked. He has never used smokeless tobacco. He reports current alcohol use. He reports current drug use. Drug: Marijuana.    Family History  No family history on file.     Allergies  Patient has no known allergies.    Review of Systems   Musculoskeletal:  Positive for joint swelling and myalgias.   All other systems reviewed and are negative.       Physical Exam  Constitutional:       Appearance: Normal appearance. He is normal weight.   HENT:      Head: Normocephalic.      Nose: Nose normal.   Pulmonary:      Effort: Pulmonary effort is normal.   Musculoskeletal:         General: Normal range of motion.      Cervical back: Normal range of motion.   Neurological:      Mental Status: He is alert and oriented to person, place, and time.   Psychiatric:         Mood and Affect: Mood normal.         Behavior: Behavior normal.         Thought Content: Thought content normal.         Judgment: Judgment normal.          Last Recorded Vitals  Blood pressure 144/74, pulse 74, temperature 36.5 °C (97.7 °F), temperature source Temporal, resp. rate 16, height 1.727 m (5' 8\"), weight 92.9 kg (204 lb 12.9 oz), SpO2 100 %.    Relevant Results        right ankle arthroscopy with extensive debridement and possible subchondral drilling of talar OCD, open lateral ligament repair Brostrum-Henderson procedure.   "   Assessment/Plan   Active Problems:    Osteochondral defect of talus    Sprain of anterior talofibular ligament of right ankle    Sprain of calcaneofibular ligament of right ankle             I spent 6 minutes in the professional and overall care of this patient.      Sean Pretty PA-C

## 2024-02-07 NOTE — BRIEF OP NOTE
Date: 2024  OR Location: 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.8]     * Sprain of anterior talofibular ligament of right ankle, initial encounter [S93.491A]     * Sprain of calcaneofibular ligament of right ankle, subsequent encounter [S93.411D]     Procedures  Right Ankle Arthroscopy; Subchondral Drilling Osteochrondral Defect of Talus; Brostrum Hednerson Repair of Both Collateral Ligaments of Ankle  OH RPR PRIMARY DISRUPTED LIGAMENT ANKLE both COLLATERAL LIGAMENTS [29284]  OH ARTHRS ANKLE EXC OSTCHNDRL DFCT W/DRLG DFCT [83190]    Surgeons      * Italia Deluna - Primary    Resident/Fellow/Other Assistant:  Surgeon(s) and Role:     * Kory Metcalf DPM - Resident - Assisting  Jocy STEELE 2  Procedure Summary  Anesthesia: General  ASA: I  Anesthesia Staff: Anesthesiologist: Bruno Howell MD; Alexander Terry MD  C-AA: JOSE Schmidt; JOSE Sanchez  Estimated Blood Loss: 0mL  Intra-op Medications:   Administrations occurring from 1130 to 1530 on 24:   Medication Name Total Dose   sodium chloride 0.9 % irrigation solution 1,000 mL   lactated Ringer's irrigation solution 6,000 mL              Anesthesia Record               Intraprocedure I/O Totals          Intake    LR infusion 850.00 mL    Total Intake 850 mL       Output    Est. Blood Loss 3 mL    Total Output 3 mL       Net    Net Volume 847 mL          Specimen: No specimens collected     Staff:   Circulator: Kiel Malik RN  Relief Circulator: Char Johnson RN  Scrub Person: Valentin Maguire; Caren Weaver; Jaymie Fritz          Findings: consistent with clinical  and imaging findings     Complications:  None; patient tolerated the procedure well.     Disposition: PACU - hemodynamically stable.  Condition: stable  Specimens Collected: No specimens collected  Attending Attestation: I was personally scrubbed through the entire case.    Italia Deluna  Phone Number: [phone]

## 2024-02-07 NOTE — ANESTHESIA PROCEDURE NOTES
Airway  Date/Time: 2/7/2024 11:39 AM  Urgency: elective      Staffing  Performed: JOSE   Authorized by: Bruno Howell MD    Performed by: JOSE Schmidt  Patient location during procedure: OR    Indications and Patient Condition  Indications for airway management: anesthesia  Spontaneous Ventilation: absent  Sedation level: deep  Preoxygenated: yes  Mask difficulty assessment: 2 - vent by mask + OA or adjuvant +/- NMBA    Final Airway Details  Final airway type: endotracheal airway      Successful airway: ETT  Cuffed: yes   Successful intubation technique: direct laryngoscopy  Blade: Charles  Blade size: #4  ETT size (mm): 7.0  Cormack-Lehane Classification: grade I - full view of glottis  Placement verified by: chest auscultation   Measured from: teeth  ETT to teeth (cm): 22  Number of attempts at approach: 1

## 2024-02-07 NOTE — OP NOTE
Right Ankle Arthroscopy; Subchondral Drilling Osteochrondral Defect of Talus; Brostrum Henderson Repair of Both Collateral Ligaments of Ankle (R) Operative Note     Date: 2024  OR Location: [facility] OR    Name: Tim Lu, : 2000, Age: 23 y.o., MRN: [id number], Sex: male    Diagnosis  Pre-op Diagnosis     * Osteochondral defect of talus [M95.8]     * Sprain of anterior talofibular ligament of right ankle, initial encounter [S93.491A]     * Sprain of calcaneofibular ligament of right ankle, subsequent encounter [S93.411D] Post-op Diagnosis     * Osteochondral defect of talus [M95.8]     * Sprain of anterior talofibular ligament of right ankle, initial encounter [S93.491A]     * Sprain of calcaneofibular ligament of right ankle, subsequent encounter [S93.411D]     Procedures  Right Ankle Arthroscopy; Subchondral Drilling Osteochrondral Defect of Talus; Brostrum Henderson Repair of Both Collateral Ligaments of Ankle  ID RPR PRIMARY DISRUPTED LIGAMENT ANKLE BOTH LATERAL COLLATERAL [98182]  ID ARTHRS ANKLE EXC OSTCHNDRL DFCT W/DRLG DFCT [[id number]]    Surgeons      * Italia Deluna - Primary    Resident/Fellow/Other Assistant:  Surgeon(s) and Role:     * Kory Metcalf DPM - Resident - Assisting    Procedure Summary  Anesthesia: General  ASA: I  Anesthesia Staff: Anesthesiologist: Bruno Howell MD; Alexander Terry MD  C-AA: JOSE Schmidt; JOSE Sanchez  Estimated Blood Loss: less than 5mL  Intra-op Medications:   Administrations occurring from 1130 to 1530 on 24:   Medication Name Total Dose   sodium chloride 0.9 % irrigation solution 1,000 mL   lactated Ringer's irrigation solution 6,000 mL   lactated Ringer's infusion 40 mL   oxygen (O2) therapy Cannot be calculated   ondansetron (Zofran) injection 4 mg 4 mg              Anesthesia Record               Intraprocedure I/O Totals          Intake    LR infusion 850.00 mL    Total Intake 850 mL       Output    Est. Blood Loss 3 mL    Total  Output 3 mL       Net    Net Volume 847 mL          Specimen: No specimens collected     Staff:   Circulator: Kiel Malik RN  Relief Circulator: Char Johnson RN  Scrub Person: Valentin Ting; Caren Weaver; Jaymie Fritz         Drains and/or Catheters: * None in log *    Tourniquet Times:     Total Tourniquet Time Documented:  Thigh (Right) - 137 minutes  Total: Thigh (Right) - 137 minutes      Implants:  Implants       Type Name Action Serial No.      Implant BRACE KIT, INTERNAL ANKLE, BC, W/CC FT AND JUMP START - SN/A - [id number] Implanted N/A     Suture DX Knotless FiberTak Instability Implant Kit Implanted N/A            Indications: Tim Lu is an 23 y.o. 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 After discussing the alternatives of treatment in detail with the patient, the patient selected surgical intervention and/or reconstruction.  We discussed with the patient risks and benefits of the procedure(s).  Risks of surgery were reviewed including pain, bleeding, infection, wound healing problems, soft tissue or bone healing problems, injury to nerves or vessels, implant or hardware related complications, chronic extremity stiffness or pain or swelling, post-traumatic arthritis, recurrent symptoms, DVT, PE and other medical complications.  After the patient´s questions were answered in detail including post-operative course requiring nonweightbearing initially and the extensive rehabilitation needed after surgery, the patient then gave informed consent for the procedure.    DESCRIPTION OF PROCEDURE  The patient was identified in the pre-operative area and the surgical extremity was marked with a skin marker to designate surgical site.  Anesthesia consult placed popliteal block without complication.   Patient then was brought back to the operating room.  A huddle was called and confirmed upon entry into the operating room as per protocol.  Time out was called and confirmed prior to skin incision.  General anesthetic was administered and LMA placed without complication.  Patient received IV Ancef prior to skin incision as per protocol.  DVT prophylaxis was performed using stocking and SCD application on well leg.  A well-padded tourniquet was placed on the right upper thigh and was elevated to 280mmHg for 137 minutes during the case.  The patient then was carefully positioned on the beanbag with bump underneath the ipsilateral hip to help with ankle access during the case.  All superficial nerve areas and bony prominences were well-padded and protected during the case and neutral spinal alignment maintained.  The operative leg was placed in a well-padded Ferkel leg noriega to protect the neurovascular structures.  We then proceeded to prep and drape in the usual standard sterile fashion.  The sterile ankle stirrup and distractor system was gently applied to the operative ankle.  We then proceeded with right ankle arthroscopy with subchondral drilling of osteochondral defect of the talus.  Anatomical landmarks of the right ankle were then identified and anteromedial portal localized with an 18 gauge spinal needle.  A small nick in the skin was made with an 11 blade and portal was then made with a mosquito hemostat.  We then introduced the 2.7mm 30 degree arthroscope into the ankle joint.  Looking anterolaterally, we then localized our anterolateral portal under direct visualization with an 18 gauge spinal needle.  Yandel and spread technique was then used to create the anterolateral portal.  We then performed diagnostic arthroscopy which revealed osteochondral defect of talus with delaminated loose cartilage involving the anterior lateral talus (3 unstable flaps), impaction injury to the tibial plafond and lateral  aspect, small area chondromalacia grade 2 posterior medial tibia, symptomatic meniscoid lesion, two plical bands 1 anterior medially and 1 anterior laterally extending across the joint anteriorly causing impingement and some areas of grade 2 chondromalacia of the anterior portion of the distal aspect of the talar dome.  We then proceeded with extensive debridement of the medial lateral gutters, excision of the hypertrophic plical bands or adhesions, and utilized multiple curettes curved and ring type to remove the unstable cartilage flap from the osteochondral defect of the anterolateral talus.  We evaluated the anterior portion of the distal tibia and there was no spur formation.  We evaluated the talar neck from the medial and lateral sides and there were no significant spurs on the anterior talar neck areas.  Once we had a nice healthy cartilage border to the osteochondral defect, the defect size was measured with probe and was 6 mm anterior to posterior and 6 mm medial to lateral.  We then proceeded with microfracture or subchondral drilling of the osteochondral defect of the anterior lateral talar dome utilizing curved awl brought through the anterior lateral portal with the scope in the anterior medial portal.  Once we completed microfracture or subchondral drilling, we then switched portals with the scope and then turned off the fluid and applied suction to make sure there was good bleeding at the osteochondral defect site which there was.  We then copiously irrigated the ankle joint.  Once the arthroscopy was completed, the operative ankle was carefully taken out of the ankle stirrup and leg noriega removed.  Portals then were closed with 4-0 nylon sutures.  We then proceeded with the open portion of the case, repair of both lateral collateral ligaments of the right ankle ATFL and CFL with Broström Henderson technique augmented with internal brace.  We then utilized a curvilinear incision starting from the distal  tip of the fibula extending toward the talar neck.  Incision was made with 15 blade.  Bipolar cautery was utilized throughout the case for careful hemostasis.  We then carefully dissected and then identified the inferior extensor retinaculum.  We then placed tenotomy scissor underneath the retinaculum and placed tag stitches with 2-0 Vicryl for later repair and advancement of the retinaculum.  We then released the retinaculum with tenotomy scissors.  We then identified the ATFL which was hypertrophic and lax.  Examination anesthesia revealed 2+ anterior drawer test and 2+ talar tilt test.  We placed a hemostat underneath the ATFL we confirmed the footprint of the ATFL utilizing bony landmark of the posterior lateral tip of the fibula and centered footprint of the ATFL was approximately 14 mm away.  We then utilized the fibertak 1.3 mm suture anchor Arthrex and placed 2 anchors 1 on the superior aspect and 1 on the inferior aspect of the ATFL.  We drilled into the fibula, placed the anchor and then did Tim-Ronnie type advancement stitches utilizing FiberWire suture from each anchor and different portions of the ligament.  We then everted the hindfoot and ankle dorsiflexed as we tensioned the FiberWire stitch and tightened the ATFL.  Knotless anchor was utilized we then trimmed the suture once the anchor and stitch were seated for both anchors.  Anterior drawer testing was now negative.  We then opened up the distal peroneal sheath to evaluate the CFL ligament.  There was a complete interstitial tear of the CFL in an oblique orientation.  #2 FiberWire was utilized to repair and tightened the CFL, ankle held in neutral dorsiflexion and eversion when tying the suture down.  We then performed talar tilt testing which was now negative.  Parminder irrigation was performed and we closed the peroneal sheath with 2-0 Vicryl mattress sutures.  We then turned our attention to placement of the internal brace as a check rein for  ATFL Broström Henderson repair.  We utilized mini C-arm fluoroscopy and obtained a perfect lateral x-ray of the ankle.  We then utilized the guidepin and localize the footprint of the ATFL on the talar neck and confirmed good position of our guidepin with with mini fluoroscopy lateral x-rays.  We then drilled our guidepin into the talus to the appropriate depth.  We then overdrilled.  We then placed our first swivel lock anchor in the talus utilizing mallets and once seated advanced the interference screw with excellent bony purchase and fixation noted.  We then localized the appropriate position on the fibula and advanced the guidepin to appropriate depth.  We then reamed or overdrilled.  We then held the ankle in neutral dorsiflexion and neutral eversion-inversion and placed a curved hemostat underneath the internal brace as we brought it to the entry point into the fibula.  We then folded up the suture onto the shaft of the  where there was a black line.  We marked the suture with a sterile marker at that black line.  We then advanced the eyelet of the swivel lock anchor to the black line.  This was done to prevent overtensioning of the internal brace.  We then removed the guidepin and then inserted the swivel lock anchor and utilized a mallet to seat the anchor.  We then advanced the interference screw with excellent bony purchase and fixation.  We then used a 15 blade to remove excess suture.  Ankle had full range of motion.  Anterior drawer testing and talar tilt testing were negative.  We then proceeded with copious irrigation.  We then repaired the extensor vernacular with interrupted 2-0 Vicryl advancing sutures.  We then irrigated again and then closed the subdermal tissue with interrupted 4-0 Vicryl suture and then skin with 4-0 nylon.    Tourniquet was released and all toes were pink and warm with distal pulses intact.  Dressing including xeroform, fluffs, ABD´s, soft roll was applied.  Patient was  placed in a well-padded short leg splint (sugar-tong and posterior) and dressing completed with ace wraps.  The patient was transported to the PACU in stable condition.  Patient will be non-weightbearing on their operative ankle with crutches, knee walker or wheelchair.  Patient fulfilled post-procedure discharge criteria and was released home.  Patient and patient representatives were given detailed discharge instructions and home-going medications including Percocet for severe pain only, Zofran, Senna and DVT prophylaxis with enteric-coated aspirin on postoperative day #1.  We discussed with the patient the different medications available for DVT prophylaxis and after discussion of risks and benefits the patient selected the above.  Splint care reviewed and splint will be kept in place until follow-up in the office in 10-14 days.  Findings were discussed with the patient and their representative after the procedure and questions were answered in detail.      Complications:  None; patient tolerated the procedure well.    Disposition: PACU - hemodynamically stable.  Condition: stable     Attending Attestation: I was present and scrubbed for the entire procedure.    Italia Deluna  Phone Number: [phone]

## 2024-02-07 NOTE — ANESTHESIA POSTPROCEDURE EVALUATION
Patient: Tim Lu    Procedure Summary       Date: 02/07/24 Room / Location: [facility] A OR 17 / Virtual [facility] A OR    Anesthesia Start: 1122 Anesthesia Stop: 1448    Procedure: Right Ankle Arthroscopy; Subchondral Drilling Osteochrondral Defect of Talus; Brostrum Henderson Repair of Both Collateral Ligaments of Ankle (Right: Ankle) Diagnosis:       Osteochondral defect of talus      Sprain of anterior talofibular ligament of right ankle, initial encounter      Sprain of calcaneofibular ligament of right ankle, subsequent encounter      (Osteochondral defect of talus [M95.8])      (Sprain of anterior talofibular ligament of right ankle, initial encounter [S93.491A])      (Sprain of calcaneofibular ligament of right ankle, subsequent encounter [S93.411D])    Surgeons: Italia Deluna MD Responsible Provider: JOSE Schmidt    Anesthesia Type: general, regional ASA Status: 1            Anesthesia Type: general, regional    Vitals Value Taken Time   /70 02/07/24 1451   Temp 36.9 02/07/24 1451   Pulse 95 02/07/24 1450   Resp 18 02/07/24 1450   SpO2 100 % 02/07/24 1450   Vitals shown include unvalidated device data.    Anesthesia Post Evaluation    Patient location during evaluation: bedside  Patient participation: complete - patient cannot participate  Level of consciousness: awake  Pain score: 0  Pain management: adequate  Airway patency: patent  Cardiovascular status: acceptable  Respiratory status: acceptable  Hydration status: acceptable  Postoperative Nausea and Vomiting: none        No notable events documented.

## 2024-02-07 NOTE — ANESTHESIA PROCEDURE NOTES
Peripheral Block    Patient location during procedure: pre-op  Start time: 2/7/2024 10:18 AM  End time: 2/7/2024 10:35 AM  Reason for block: procedure for pain, at surgeon's request and post-op pain management  Staffing  Performed: attending   Authorized by: Bruno Howell MD    Performed by: Bruno Howell MD  Preanesthetic Checklist  Completed: patient identified, IV checked, site marked, risks and benefits discussed, surgical consent, monitors and equipment checked, pre-op evaluation and timeout performed   Timeout performed at: 2/7/2024 10:18 AM  Peripheral Block  Patient position: laying flat  Prep: alcohol swabs  Patient monitoring: continuous pulse ox  Block type: adductor canal and popliteal  Laterality: right  Injection technique: single-shot  Guidance: ultrasound guided  Local infiltration: lidocaine  Needle  Needle type: short-bevel   Needle gauge: 22 G  Needle length: 8 cm  Needle localization: ultrasound guidance     image stored in chart  Test dose: negative  Assessment  Injection assessment: negative aspiration for heme, no paresthesia on injection, incremental injection and local visualized surrounding nerve on ultrasound  Heart rate change: no  Slow fractionated injection: yes  Additional Notes  40 ml 0.375% bupivacaine with 1:200K epi and 2.67 mg dexamethasone injected for popliteal block, same solution half dose for adductor

## 2024-02-09 ENCOUNTER — APPOINTMENT (OUTPATIENT)
Dept: ORTHOPEDIC SURGERY | Facility: HOSPITAL | Age: 24
End: 2024-02-09
Payer: COMMERCIAL

## 2024-02-16 ENCOUNTER — OFFICE VISIT (OUTPATIENT)
Dept: ORTHOPEDIC SURGERY | Facility: HOSPITAL | Age: 24
End: 2024-02-16
Payer: COMMERCIAL

## 2024-02-16 DIAGNOSIS — M95.8 OSTEOCHONDRAL DEFECT OF TALUS: Primary | ICD-10-CM

## 2024-02-16 DIAGNOSIS — S93.411A SPRAIN OF CALCANEOFIBULAR LIGAMENT OF RIGHT ANKLE, INITIAL ENCOUNTER: ICD-10-CM

## 2024-02-16 DIAGNOSIS — S93.491A SPRAIN OF ANTERIOR TALOFIBULAR LIGAMENT, RIGHT, INITIAL ENCOUNTER: ICD-10-CM

## 2024-02-16 PROCEDURE — 99024 POSTOP FOLLOW-UP VISIT: CPT | Performed by: PHYSICIAN ASSISTANT

## 2024-02-16 PROCEDURE — 1036F TOBACCO NON-USER: CPT | Performed by: PHYSICIAN ASSISTANT

## 2024-02-16 NOTE — PROGRESS NOTES
NPV-   History of Present Illness  23 y.o.male here for pov s/p right ankle arthroscopy with extensive debridement and subchondral drilling of talar osteochondral lesion; open Brostrum-Henderson procedure with repair of both collateral ligaments on 2/7/24   1. Osteochondral defect of talus        2. Sprain of anterior talofibular ligament, right, initial encounter        3. Sprain of calcaneofibular ligament of right ankle, initial encounter          . Patient reports they are doing well. No pain.        On examination of right, incision is clean/dry/intact.  Sutures are intact.  No bleeding or drainage. Healing well.  Minimal swelling. Improved ROM and strength.  Neurovascularly intact.  Normal sensation to light touch.  Dorsalis pedis and posterior tibial pulses 2+ bilaterally. wiggles toes, calf soft and nontender    A/P:  s/p right ankle arthroscopy with extensive debridement and subchondral drilling of talar osteochondral lesion; open Brostrum-Henderson procedure with repair of both collateral ligaments on 2/7/24   -  splint removed and incisions clean and dressed  - Patient was placed in a well padded fiberglass cast/splint to be nonweightbearing with crutches. They will keep the cast/splint elevated supported at the calf with NO pressure on the heel to reduce swelling.  - The patient was given a prescription for physical therapy.  Physical therapy is medically necessary to improve strength, balance, range of motion and functional outcomes after injury and/or surgery.  - Continue your aspirin daily with food for blood clot prevention  - All the patient's questions were answered. The patient agrees with the above plan.  Follow up in 2 weeks for suture removal

## 2024-02-16 NOTE — PATIENT INSTRUCTIONS
You were placed in a cast/splint to be nonweightbearing with your crutches. DO NOT place any weight on your cast/splint. It is important to keep your cast/splint elevated supported at the calf with NO pressure on the heel to reduce swelling.    Continue your aspirin daily with food for blood clot prevention    The patient was given a prescription for physical therapy.  Physical therapy is medically necessary to improve strength, balance, range of motion and functional outcomes after injury and/or surgery. Set up physical therapy for after your next office visit    Follow up in 2 weeks for suture removal; bring your walking boot

## 2024-03-01 ENCOUNTER — OFFICE VISIT (OUTPATIENT)
Dept: ORTHOPEDIC SURGERY | Facility: HOSPITAL | Age: 24
End: 2024-03-01
Payer: COMMERCIAL

## 2024-03-01 DIAGNOSIS — S93.411D SPRAIN OF CALCANEOFIBULAR LIGAMENT OF RIGHT ANKLE, SUBSEQUENT ENCOUNTER: ICD-10-CM

## 2024-03-01 DIAGNOSIS — M95.8 OSTEOCHONDRAL DEFECT OF TALUS: ICD-10-CM

## 2024-03-01 DIAGNOSIS — S93.491D SPRAIN OF ANTERIOR TALOFIBULAR LIGAMENT OF RIGHT ANKLE, SUBSEQUENT ENCOUNTER: ICD-10-CM

## 2024-03-01 PROCEDURE — 1036F TOBACCO NON-USER: CPT | Performed by: PHYSICIAN ASSISTANT

## 2024-03-01 PROCEDURE — L4361 PNEUMA/VAC WALK BOOT PRE OTS: HCPCS | Performed by: PHYSICIAN ASSISTANT

## 2024-03-01 PROCEDURE — 99024 POSTOP FOLLOW-UP VISIT: CPT | Performed by: PHYSICIAN ASSISTANT

## 2024-03-01 ASSESSMENT — PAIN - FUNCTIONAL ASSESSMENT: PAIN_FUNCTIONAL_ASSESSMENT: 0-10

## 2024-03-01 ASSESSMENT — PAIN SCALES - GENERAL: PAINLEVEL_OUTOF10: 1

## 2024-03-01 NOTE — PROGRESS NOTES
NPV-   History of Present Illness  23 y.o.male here for pov s/p right ankle arthroscopy with extensive debridement and subchondral drilling of talar osteochondral lesion; open Brostrum-Henderson procedure with repair of both collateral ligaments on 2/7/24   1. Osteochondral defect of talus  Walking Boot Tall      2. Sprain of anterior talofibular ligament of right ankle, subsequent encounter  Walking Boot Tall      3. Sprain of calcaneofibular ligament of right ankle, subsequent encounter  Walking Boot Tall        . Patient reports they are doing well. No pain.  NWB on his cast      On examination of right, incision is clean/dry/intact.  Sutures are intact.  No bleeding or drainage. Healing well.  Minimal swelling. Improved ROM and strength.  Neurovascularly intact.  Normal sensation to light touch.  Dorsalis pedis and posterior tibial pulses 2+ bilaterally. wiggles toes, calf soft and nontender    A/P:  s/p right ankle arthroscopy with extensive debridement and subchondral drilling of talar osteochondral lesion; open Brostrum-Henderson procedure with repair of both collateral ligaments on 2/7/24   -  cast and sutures removed and incisions clean and dressed  - Patient was placed in a tall CAM walker boot to be NWB with crutches.  They were given boot instructions.  - The patient was given a prescription for physical therapy.  Physical therapy is medically necessary to improve strength, balance, range of motion and functional outcomes after injury and/or surgery.  - All the patient's questions were answered. The patient agrees with the above plan.  Follow up in 3 weeks    Patient was prescribed a CAM walker boot for ankle sprain.The patient is ambulatory with or without aid; but, has weakness, instability and/or deformity of their right ankle/foot which requires stabilization from this orthosis to improve their function.      Verbal and written instructions for the use, wear schedule, cleaning and application of this item  were given.  Patient was instructed that should the brace result in increased pain, decreased sensation, increased swelling, or an overall worsening of their medical condition, to please contact our office immediately.     Orthotic management and training was provided for skin care, modifications due to healing tissues, edema changes, interruption in skin integrity, and safety precautions with the orthosis.

## 2024-03-01 NOTE — PATIENT INSTRUCTIONS
YOUR BOOT INSTRUCTIONS:  You CANNOT put weight on your foot and ankle while in the boot.    You can use crutches or walker for support   You should wear boot when walking or standing   You can take off your boot while bathing, sitting, stretching, icing or doing therapy exercises.  You can take your boot off at nighttime while sleeping after 1 week    Keep your incisions clean and dry    Ice and elevate supported at the calf to reduce swelling    The patient was given a prescription for physical therapy.  Physical therapy is medically necessary to improve strength, balance, range of motion and functional outcomes after injury and/or surgery.    Follow up in 3 weeks

## 2024-03-25 ENCOUNTER — OFFICE VISIT (OUTPATIENT)
Dept: ORTHOPEDIC SURGERY | Facility: HOSPITAL | Age: 24
End: 2024-03-25
Payer: COMMERCIAL

## 2024-03-25 DIAGNOSIS — S93.491D SPRAIN OF ANTERIOR TALOFIBULAR LIGAMENT OF RIGHT ANKLE, SUBSEQUENT ENCOUNTER: ICD-10-CM

## 2024-03-25 DIAGNOSIS — M95.8 OSTEOCHONDRAL DEFECT OF TALUS: Primary | ICD-10-CM

## 2024-03-25 PROCEDURE — 1036F TOBACCO NON-USER: CPT | Performed by: ORTHOPAEDIC SURGERY

## 2024-03-25 PROCEDURE — 99024 POSTOP FOLLOW-UP VISIT: CPT | Performed by: ORTHOPAEDIC SURGERY

## 2024-03-25 ASSESSMENT — PAIN - FUNCTIONAL ASSESSMENT: PAIN_FUNCTIONAL_ASSESSMENT: NO/DENIES PAIN

## 2024-03-25 NOTE — PROGRESS NOTES
Patient comes in for pov on right ankle.  He has been NWB RLE with knee walker.  No pain, swelling pretty much gone.  He has had one PT session, doing his exercises.       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

## 2024-05-06 ENCOUNTER — OFFICE VISIT (OUTPATIENT)
Dept: ORTHOPEDIC SURGERY | Facility: HOSPITAL | Age: 24
End: 2024-05-06
Payer: COMMERCIAL

## 2024-05-06 DIAGNOSIS — M95.8 OSTEOCHONDRAL DEFECT OF TALUS: ICD-10-CM

## 2024-05-06 DIAGNOSIS — S93.491A SPRAIN OF ANTERIOR TALOFIBULAR LIGAMENT, RIGHT, INITIAL ENCOUNTER: ICD-10-CM

## 2024-05-06 DIAGNOSIS — S93.411D SPRAIN OF CALCANEOFIBULAR LIGAMENT OF RIGHT ANKLE, SUBSEQUENT ENCOUNTER: ICD-10-CM

## 2024-05-06 DIAGNOSIS — S93.491D SPRAIN OF ANTERIOR TALOFIBULAR LIGAMENT OF RIGHT ANKLE, SUBSEQUENT ENCOUNTER: Primary | ICD-10-CM

## 2024-05-06 PROCEDURE — 99024 POSTOP FOLLOW-UP VISIT: CPT | Performed by: ORTHOPAEDIC SURGERY

## 2024-05-06 PROCEDURE — 1036F TOBACCO NON-USER: CPT | Performed by: ORTHOPAEDIC SURGERY

## 2024-05-06 PROCEDURE — 99212 OFFICE O/P EST SF 10 MIN: CPT | Performed by: ORTHOPAEDIC SURGERY

## 2024-05-06 NOTE — PROGRESS NOTES
This is a pleasant 24 y.o. year old male who presents for fuv of  right ankle .  Interventions: He has been working in PT, ankle feeling good, starting jumping, starting to throw moderately, anxious to begin running program (did a bit of jogging).      PHYSICAL EXAMINATION  Constitutional Exam: patient's height and weight reviewed, well-kempt  Psychiatric Exam: alert and oriented x 3, appropriate mood and behavior  Eye Exam: FANY, EOMI  Pulmonary Exam: breathing non-labored, no apparent distress  Lymphatic exam: no appreciable lymphadenopathy in the lower extremities  Cardiovascular exam: DP pulses 2+ bilaterally, PT 2+ bilaterally, toes are pink with good capillary refill, no pitting edema  Skin exam: no open lesions, rashes, abrasions or ulcerations  Neurological exam: sensation to light touch intact in both lower extremities in peripheral and dermatomal distributions (except for any abnormalities noted in musculoskeletal exam)    Musculoskeletal exam: right ankle: full ROM, stable ligamentous exam, full strength DF/PF/INV/EV/toe ext/toe flex    ASSESSMENT: Right Ankle Arthroscopy; Subchondral Drilling Osteochrondral Defect of Talus; Brostrum Henderson Repair of Both Collateral Ligaments of Ankle   PLAN: Further treatment options discussed including finishing up PT, restarting thrower's ten and super 7 elbow exercises to start progression to full throwing, start return to run program.  FUV in 6-8 weeks.  The patient's questions were answered in detail.      Note dictated with [employer] software, completed without full type editing to avoid delay.

## 2024-06-24 ENCOUNTER — OFFICE VISIT (OUTPATIENT)
Dept: ORTHOPEDIC SURGERY | Facility: HOSPITAL | Age: 24
End: 2024-06-24
Payer: COMMERCIAL

## 2024-06-24 DIAGNOSIS — M95.8 OSTEOCHONDRAL DEFECT OF TALUS: Primary | ICD-10-CM

## 2024-06-24 PROCEDURE — 99212 OFFICE O/P EST SF 10 MIN: CPT | Performed by: ORTHOPAEDIC SURGERY

## 2024-06-24 ASSESSMENT — PAIN SCALES - GENERAL: PAINLEVEL_OUTOF10: 0 - NO PAIN

## 2024-06-24 ASSESSMENT — PAIN - FUNCTIONAL ASSESSMENT: PAIN_FUNCTIONAL_ASSESSMENT: 0-10

## 2024-06-24 NOTE — PROGRESS NOTES
This is a pleasant 24 y.o. year old male who presents for fuv of  right ankle.  Doing well, no pain, finished PT, in [place] working .  Running now full distance.     PHYSICAL EXAMINATION  Constitutional Exam: patient's height and weight reviewed, well-kempt  Psychiatric Exam: alert and oriented x 3, appropriate mood and behavior  Eye Exam: ADITHYA SOARES  Pulmonary Exam: breathing non-labored, no apparent distress  Lymphatic exam: no appreciable lymphadenopathy in the lower extremities  Cardiovascular exam: DP pulses 2+ bilaterally, PT 2+ bilaterally, toes are pink with good capillary refill, no pitting edema  Skin exam: no open lesions, rashes, abrasions or ulcerations  Neurological exam: sensation to light touch intact in both lower extremities in peripheral and dermatomal distributions (except for any abnormalities noted in musculoskeletal exam)    Musculoskeletal exam: right ankle: no effusion, no joint line tenderness,strength 5/5 for DF/PF/IV/EV, single leg squat some valgus tendencies at first and he can correct position, neg anterior drawer and talar tilt test, normal calf tone and circumference    ASSESSMENT: right ankle scope with drilling OCD, brostrum-vasquez  PLAN: Further treatment options discussed including continuing his HEP, showed him what to work on wrt single leg squat, gave him ACL prevention protocol exercises as those will also help his ankle.  FUV in 2 years- repeat standing xrays AP/lat/obl right ankle to screen for OA, OCD.  The patient's questions were answered in detail.      Note dictated with [employer] software, completed without full type editing to avoid delay.

## (undated) DEVICE — Device

## (undated) DEVICE — BLADE, OPHTHALMIC, BEAVER, MINI, SHARP ONE SIDE

## (undated) DEVICE — GLOVE, SURGICAL, PROTEXIS PI ORTHO, 7.0, PF, LF

## (undated) DEVICE — KIT, MINOR, DOUBLE BASIN

## (undated) DEVICE — SUTURE, ETHILON, 4-0, BLK, MONO, PS-2 18

## (undated) DEVICE — BANDAGE, ESMARK 4 IN X 9 FT, STERILE

## (undated) DEVICE — GLOVE, SURGICAL, PROTEXIS PI MICRO, 7.0, PF, LF

## (undated) DEVICE — BLADE, DISSECTOR, 3.0M X 7CM

## (undated) DEVICE — STRAP, ANKLE, DISTRACTOR, ARTHROSCOPY, STERILE

## (undated) DEVICE — DRESSING, GAUZE, PETROLATUM, PATCH, XEROFORM, 1 X 8 IN, STERILE

## (undated) DEVICE — GLOVE, SURGICAL, SYNTHETIC, DERMAPRENE, 7, PF, LF, GREEN

## (undated) DEVICE — SUTURE, VICRYL, 2-0, 27 IN, X-1, UNDYED

## (undated) DEVICE — DRAPE MINI, C-ARM, W/POLY STRAPS, 54 X 84 IN

## (undated) DEVICE — DRAPE, SHEET, 17 X 23 IN

## (undated) DEVICE — STRAP, ANKLE, DISTRACTOR, GUHL

## (undated) DEVICE — CUFF, TOURNIQUET, 30 X 4, DUAL PORT/DUAL BLADDER, WHITE, STERILE

## (undated) DEVICE — CUFF, TOURNIQUET, 30 X 4, DUAL PORT/SNGL BLADDER, DISP, LF

## (undated) DEVICE — DRESSING, ABDOMINAL, TENDERSORB, 8 X 10 IN, STERILE

## (undated) DEVICE — SUTURE, VICRYL, 0, 27 IN, CT-2, UNDYED

## (undated) DEVICE — SOLUTION, IRRIGATION, SODIUM CHLORIDE 0.9%, 1000 ML, POUR BOTTLE

## (undated) DEVICE — SYRINGE, 20 CC, LUER LOCK, MONOJECT, W/O CAP, LF